# Patient Record
Sex: FEMALE | URBAN - METROPOLITAN AREA
[De-identification: names, ages, dates, MRNs, and addresses within clinical notes are randomized per-mention and may not be internally consistent; named-entity substitution may affect disease eponyms.]

---

## 2023-08-03 ENCOUNTER — OFFICE VISIT (OUTPATIENT)
Dept: URBAN - METROPOLITAN AREA CLINIC 92 | Facility: CLINIC | Age: 23
End: 2023-08-03
Payer: COMMERCIAL

## 2023-08-03 VITALS
TEMPERATURE: 97.7 F | DIASTOLIC BLOOD PRESSURE: 62 MMHG | SYSTOLIC BLOOD PRESSURE: 100 MMHG | HEART RATE: 65 BPM | WEIGHT: 109 LBS | BODY MASS INDEX: 20.06 KG/M2 | HEIGHT: 62 IN

## 2023-08-03 DIAGNOSIS — K21.9 GASTROESOPHAGEAL REFLUX DISEASE WITHOUT ESOPHAGITIS: ICD-10-CM

## 2023-08-03 DIAGNOSIS — K62.5 RECTAL BLEEDING: ICD-10-CM

## 2023-08-03 DIAGNOSIS — K64.4 EXTERNAL HEMORRHOID: ICD-10-CM

## 2023-08-03 DIAGNOSIS — R05.3 CHRONIC COUGH: ICD-10-CM

## 2023-08-03 DIAGNOSIS — K58.1 IRRITABLE BOWEL SYNDROME WITH CONSTIPATION: ICD-10-CM

## 2023-08-03 PROBLEM — 440630006: Status: ACTIVE | Noted: 2023-08-03

## 2023-08-03 PROBLEM — 266435005: Status: ACTIVE | Noted: 2023-08-03

## 2023-08-03 PROCEDURE — 99204 OFFICE O/P NEW MOD 45 MIN: CPT

## 2023-08-03 RX ORDER — LINACLOTIDE 290 UG/1
1 CAPSULE AT LEAST 30 MINUTES BEFORE THE FIRST MEAL OF THE DAY ON AN EMPTY STOMACH CAPSULE, GELATIN COATED ORAL ONCE A DAY
Qty: 90 | Refills: 1 | OUTPATIENT
Start: 2023-08-03 | End: 2024-01-29

## 2023-08-03 RX ORDER — OMEPRAZOLE 40 MG/1
1 CAPSULE 30 MINUTES BEFORE MORNING MEAL CAPSULE, DELAYED RELEASE ORAL ONCE A DAY
Qty: 90 | Refills: 1 | OUTPATIENT
Start: 2023-08-03

## 2023-08-03 RX ORDER — HYDROCORTISONE 25 MG/G
1 APPLICATION CREAM TOPICAL TWICE A DAY
Qty: 1 | Refills: 1 | OUTPATIENT
Start: 2023-08-03 | End: 2023-08-23

## 2023-08-03 NOTE — HPI-TODAY'S VISIT:
22-year-old female patient presents today with complaints of constipation and blood in stool. Patient states that she has always had issues with constipation in the past and usually goes every week.  Recently she has not been going for 2 weeks at a time.  Every time she has a bowel movement she notices rectal bleeding mainly on the tissue paper.  She also notes rectal pain while having a bowel movement.  She denies any rectal itching or burning.  She has always had blood when she wipes every time she has a bowel movement for several years.  She has no associated abdominal pain, melena or weight loss.  She has not tried anything for her constipation or rectal bleeding. She also notes issues with chronic cough that has been occurring for several months.  She states that cough will occur after eating certain types of foods.  She does have some burning as well with this.  She has not tried thing for this in the past.  She denies any nausea, vomiting, fevers, chills, dysphagia, dyne aphasia, NSAID use.  No family history of any GI related cancers. She sees PCP regular had normal blood work few months ago.

## 2023-08-03 NOTE — PHYSICAL EXAM GASTROINTESTINAL
Abdomen,  soft, nontender, nondistended,  no guarding or rigidity,  no masses palpable,  normal bowel sounds Liver and Spleen,no hepatosplenomegaly Rectal one large EH noted non bleeding non thrombosed. Skin tag noted on posterior no fissure

## 2023-08-07 ENCOUNTER — OFFICE VISIT (OUTPATIENT)
Dept: URBAN - METROPOLITAN AREA CLINIC 92 | Facility: CLINIC | Age: 23
End: 2023-08-07

## 2023-09-13 ENCOUNTER — OFFICE VISIT (OUTPATIENT)
Dept: URBAN - METROPOLITAN AREA CLINIC 92 | Facility: CLINIC | Age: 23
End: 2023-09-13
Payer: COMMERCIAL

## 2023-09-13 VITALS
HEIGHT: 62 IN | TEMPERATURE: 97.2 F | BODY MASS INDEX: 20.24 KG/M2 | DIASTOLIC BLOOD PRESSURE: 69 MMHG | SYSTOLIC BLOOD PRESSURE: 104 MMHG | HEART RATE: 77 BPM | WEIGHT: 110 LBS

## 2023-09-13 DIAGNOSIS — R05.3 CHRONIC COUGH: ICD-10-CM

## 2023-09-13 DIAGNOSIS — K21.9 GASTROESOPHAGEAL REFLUX DISEASE WITHOUT ESOPHAGITIS: ICD-10-CM

## 2023-09-13 DIAGNOSIS — K58.1 IRRITABLE BOWEL SYNDROME WITH CONSTIPATION: ICD-10-CM

## 2023-09-13 DIAGNOSIS — K64.4 EXTERNAL HEMORRHOID: ICD-10-CM

## 2023-09-13 DIAGNOSIS — K62.5 RECTAL BLEEDING: ICD-10-CM

## 2023-09-13 PROCEDURE — 99213 OFFICE O/P EST LOW 20 MIN: CPT

## 2023-09-13 RX ORDER — LINACLOTIDE 290 UG/1
1 CAPSULE AT LEAST 30 MINUTES BEFORE THE FIRST MEAL OF THE DAY ON AN EMPTY STOMACH CAPSULE, GELATIN COATED ORAL ONCE A DAY
Qty: 90 | Refills: 1 | Status: ACTIVE | COMMUNITY
Start: 2023-08-03 | End: 2024-01-29

## 2023-09-13 RX ORDER — OMEPRAZOLE 40 MG/1
1 CAPSULE 30 MINUTES BEFORE MORNING MEAL CAPSULE, DELAYED RELEASE ORAL ONCE A DAY
Qty: 90 | Refills: 1 | Status: ACTIVE | COMMUNITY
Start: 2023-08-03

## 2023-09-13 NOTE — HPI-TODAY'S VISIT:
22-year-old female patient presents today with complaints of constipation and blood in stool  Patient states that she has always had issues with constipation in the past and usually goes every week.  Recently she has not been going for 2 weeks at a time. After last visit she was placed on Linzess 290mcg. This allows her to have 2 BM daily but has urgency and bloating. She also noted rectal bleeding. EH was found on exam. Anusol was rx but she never picked this up. Rectal bleeding resolved on its own after constipation resolved.   She also noted issues with chronic cough with mucus production that has been occurring for several months.  She states that cough will occur after eating certain types of foods.  She does have some burning as well with this. She also notes post nasal drip. She tried Omeprazole 40mg with no relief.   She denies any nausea, vomiting, fevers, chills, dysphagia, odynophagia, NSAID use.  No family history of any GI related cancers.  She sees PCP regular had normal blood work few months ago.

## 2023-12-07 ENCOUNTER — OFFICE VISIT (OUTPATIENT)
Dept: URBAN - METROPOLITAN AREA CLINIC 92 | Facility: CLINIC | Age: 23
End: 2023-12-07
Payer: COMMERCIAL

## 2023-12-07 VITALS
DIASTOLIC BLOOD PRESSURE: 62 MMHG | HEIGHT: 62 IN | HEART RATE: 68 BPM | WEIGHT: 111 LBS | TEMPERATURE: 97 F | BODY MASS INDEX: 20.43 KG/M2 | SYSTOLIC BLOOD PRESSURE: 100 MMHG

## 2023-12-07 DIAGNOSIS — K21.9 GASTROESOPHAGEAL REFLUX DISEASE WITHOUT ESOPHAGITIS: ICD-10-CM

## 2023-12-07 DIAGNOSIS — K62.5 RECTAL BLEEDING: ICD-10-CM

## 2023-12-07 DIAGNOSIS — K64.4 EXTERNAL HEMORRHOID: ICD-10-CM

## 2023-12-07 DIAGNOSIS — K58.1 IRRITABLE BOWEL SYNDROME WITH CONSTIPATION: ICD-10-CM

## 2023-12-07 DIAGNOSIS — R05.3 CHRONIC COUGH: ICD-10-CM

## 2023-12-07 PROCEDURE — 99213 OFFICE O/P EST LOW 20 MIN: CPT

## 2023-12-07 RX ORDER — OMEPRAZOLE 40 MG/1
1 CAPSULE 30 MINUTES BEFORE MORNING MEAL CAPSULE, DELAYED RELEASE ORAL TWICE A DAY
Qty: 180 | Refills: 1 | OUTPATIENT
Start: 2023-12-07

## 2023-12-07 RX ORDER — OMEPRAZOLE 40 MG/1
1 CAPSULE 30 MINUTES BEFORE MORNING MEAL CAPSULE, DELAYED RELEASE ORAL ONCE A DAY
Qty: 90 | Refills: 1 | Status: DISCONTINUED | COMMUNITY
Start: 2023-08-03

## 2023-12-07 RX ORDER — LINACLOTIDE 290 UG/1
1 CAPSULE AT LEAST 30 MINUTES BEFORE THE FIRST MEAL OF THE DAY ON AN EMPTY STOMACH CAPSULE, GELATIN COATED ORAL ONCE A DAY
Qty: 90 | Refills: 1 | Status: ACTIVE | COMMUNITY
Start: 2023-08-03 | End: 2024-01-29

## 2023-12-07 NOTE — HPI-TODAY'S VISIT:
22-year-old female patient presents today with complaints of constipation and blood in stool  Patient states that she has always had issues with constipation in the past and usually goes every week.  Recently she has not been going for 2 weeks at a time. After last visit she was placed on Linzess 290mcg. This allows her to have 2 BM daily but has urgency and bloating. She was told to try Linzess 145mcg and Trulance. Linzess 145mcg works and does not cause her urgency or bloating like 290 did. However, her stool is softer. Trulance caused her to have too many BM daily. She also noted rectal bleeding. EH was found on exam. Anusol was rx but she never picked this up. Rectal bleeding resolved on its own after constipation resolved.   She also noted issues with chronic cough with mucus production that has been occurring for several months.  She states that cough will occur after eating certain types of foods.  She does have some burning as well with this. She also notes post nasal drip. She tried Omeprazole 40mg with no relief after last visit she d/c. Once she d/c this she noticed that omeprazole was actually helping some. Has not seen ENT yet. She denies any nausea, vomiting, fevers, chills, dysphagia, odynophagia, NSAID use.  No family history of any GI related cancers.  She sees PCP regular had normal blood work few months ago.

## 2024-01-29 ENCOUNTER — TELEPHONE ENCOUNTER (OUTPATIENT)
Dept: URBAN - METROPOLITAN AREA CLINIC 92 | Facility: CLINIC | Age: 24
End: 2024-01-29

## 2024-01-29 RX ORDER — LINACLOTIDE 290 UG/1
1 CAPSULE AT LEAST 30 MINUTES BEFORE THE FIRST MEAL OF THE DAY ON AN EMPTY STOMACH CAPSULE, GELATIN COATED ORAL ONCE A DAY
Qty: 90 | Refills: 1
Start: 2023-08-03 | End: 2024-07-27

## 2024-01-30 ENCOUNTER — TELEPHONE ENCOUNTER (OUTPATIENT)
Dept: URBAN - METROPOLITAN AREA CLINIC 92 | Facility: CLINIC | Age: 24
End: 2024-01-30

## 2024-03-07 ENCOUNTER — OV EP (OUTPATIENT)
Dept: URBAN - METROPOLITAN AREA CLINIC 92 | Facility: CLINIC | Age: 24
End: 2024-03-07

## 2024-03-07 RX ORDER — OMEPRAZOLE 40 MG/1
1 CAPSULE 30 MINUTES BEFORE MORNING MEAL CAPSULE, DELAYED RELEASE ORAL TWICE A DAY
Qty: 180 | Refills: 1 | Status: ACTIVE | COMMUNITY
Start: 2023-12-07

## 2024-03-07 RX ORDER — OMEPRAZOLE 40 MG/1
1 CAPSULE 30 MINUTES BEFORE MORNING MEAL CAPSULE, DELAYED RELEASE ORAL TWICE A DAY
Qty: 180 | Refills: 1 | OUTPATIENT

## 2024-03-07 RX ORDER — LINACLOTIDE 290 UG/1
1 CAPSULE AT LEAST 30 MINUTES BEFORE THE FIRST MEAL OF THE DAY ON AN EMPTY STOMACH CAPSULE, GELATIN COATED ORAL ONCE A DAY
Qty: 90 | Refills: 1 | Status: ACTIVE | COMMUNITY
Start: 2023-08-03 | End: 2024-07-27

## 2024-05-01 ENCOUNTER — OFFICE VISIT (OUTPATIENT)
Dept: URBAN - METROPOLITAN AREA CLINIC 92 | Facility: CLINIC | Age: 24
End: 2024-05-01

## 2024-05-01 RX ORDER — OMEPRAZOLE 40 MG/1
1 CAPSULE 30 MINUTES BEFORE MORNING MEAL CAPSULE, DELAYED RELEASE ORAL TWICE A DAY
Qty: 180 | Refills: 1 | Status: ACTIVE | COMMUNITY

## 2024-05-01 RX ORDER — LINACLOTIDE 290 UG/1
1 CAPSULE AT LEAST 30 MINUTES BEFORE THE FIRST MEAL OF THE DAY ON AN EMPTY STOMACH CAPSULE, GELATIN COATED ORAL ONCE A DAY
Qty: 90 | Refills: 1 | Status: ACTIVE | COMMUNITY
Start: 2023-08-03 | End: 2024-07-27

## 2024-05-07 ENCOUNTER — DASHBOARD ENCOUNTERS (OUTPATIENT)
Age: 24
End: 2024-05-07

## 2024-05-07 ENCOUNTER — OFFICE VISIT (OUTPATIENT)
Dept: URBAN - METROPOLITAN AREA CLINIC 105 | Facility: CLINIC | Age: 24
End: 2024-05-07
Payer: COMMERCIAL

## 2024-05-07 VITALS
SYSTOLIC BLOOD PRESSURE: 110 MMHG | DIASTOLIC BLOOD PRESSURE: 75 MMHG | HEART RATE: 78 BPM | WEIGHT: 115.2 LBS | BODY MASS INDEX: 21.2 KG/M2 | TEMPERATURE: 98.6 F | HEIGHT: 62 IN

## 2024-05-07 DIAGNOSIS — R05.3 CHRONIC COUGH: ICD-10-CM

## 2024-05-07 DIAGNOSIS — K58.1 IRRITABLE BOWEL SYNDROME WITH CONSTIPATION: ICD-10-CM

## 2024-05-07 DIAGNOSIS — R14.0 BLOATING: ICD-10-CM

## 2024-05-07 PROCEDURE — 99214 OFFICE O/P EST MOD 30 MIN: CPT | Performed by: INTERNAL MEDICINE

## 2024-05-07 RX ORDER — LINACLOTIDE 290 UG/1
1 CAPSULE AT LEAST 30 MINUTES BEFORE THE FIRST MEAL OF THE DAY ON AN EMPTY STOMACH CAPSULE, GELATIN COATED ORAL ONCE A DAY
Qty: 90 | Refills: 1 | Status: ACTIVE | COMMUNITY
Start: 2023-08-03 | End: 2024-07-27

## 2024-05-07 RX ORDER — OMEPRAZOLE 40 MG/1
1 CAPSULE 30 MINUTES BEFORE MORNING MEAL CAPSULE, DELAYED RELEASE ORAL TWICE A DAY
Qty: 180 | Refills: 1 | Status: ACTIVE | COMMUNITY

## 2024-05-07 RX ORDER — CIPROFLOXACIN HYDROCHLORIDE 500 MG/1
1 TABLET TABLET, FILM COATED ORAL
Qty: 20 TABLET | Refills: 0 | OUTPATIENT
Start: 2024-05-07 | End: 2024-05-17

## 2024-06-16 ENCOUNTER — WEB ENCOUNTER (OUTPATIENT)
Dept: URBAN - METROPOLITAN AREA CLINIC 92 | Facility: CLINIC | Age: 24
End: 2024-06-16

## 2024-06-17 ENCOUNTER — WEB ENCOUNTER (OUTPATIENT)
Dept: URBAN - METROPOLITAN AREA CLINIC 92 | Facility: CLINIC | Age: 24
End: 2024-06-17

## 2024-07-26 ENCOUNTER — OFFICE VISIT (OUTPATIENT)
Dept: URBAN - METROPOLITAN AREA CLINIC 105 | Facility: CLINIC | Age: 24
End: 2024-07-26

## 2024-07-26 ENCOUNTER — LAB OUTSIDE AN ENCOUNTER (OUTPATIENT)
Dept: URBAN - METROPOLITAN AREA CLINIC 105 | Facility: CLINIC | Age: 24
End: 2024-07-26

## 2024-07-26 VITALS
HEART RATE: 54 BPM | BODY MASS INDEX: 21.53 KG/M2 | TEMPERATURE: 97 F | HEIGHT: 62 IN | SYSTOLIC BLOOD PRESSURE: 109 MMHG | WEIGHT: 117 LBS | DIASTOLIC BLOOD PRESSURE: 73 MMHG

## 2024-07-26 DIAGNOSIS — R05.3 CHRONIC COUGH: ICD-10-CM

## 2024-07-26 DIAGNOSIS — K60.2 ANAL FISSURE: ICD-10-CM

## 2024-07-26 DIAGNOSIS — K62.5 RECTAL BLEEDING: ICD-10-CM

## 2024-07-26 DIAGNOSIS — R14.0 BLOATING: ICD-10-CM

## 2024-07-26 DIAGNOSIS — K58.1 IRRITABLE BOWEL SYNDROME WITH CONSTIPATION: ICD-10-CM

## 2024-07-26 PROCEDURE — 99214 OFFICE O/P EST MOD 30 MIN: CPT | Performed by: INTERNAL MEDICINE

## 2024-07-26 RX ORDER — LINACLOTIDE 290 UG/1
1 CAPSULE AT LEAST 30 MINUTES BEFORE THE FIRST MEAL OF THE DAY ON AN EMPTY STOMACH CAPSULE, GELATIN COATED ORAL ONCE A DAY
Qty: 90 | Refills: 1 | Status: ON HOLD | COMMUNITY
Start: 2023-08-03 | End: 2024-07-27

## 2024-07-26 RX ORDER — LINACLOTIDE 72 UG/1
1 CAPSULE AT LEAST 30 MINUTES BEFORE THE FIRST MEAL OF THE DAY ON AN EMPTY STOMACH CAPSULE, GELATIN COATED ORAL ONCE A DAY
Qty: 90 | Refills: 0 | OUTPATIENT
Start: 2024-07-26 | End: 2024-10-24

## 2024-07-26 RX ORDER — OMEPRAZOLE 40 MG/1
1 CAPSULE 30 MINUTES BEFORE MORNING MEAL CAPSULE, DELAYED RELEASE ORAL TWICE A DAY
Qty: 180 | Refills: 1 | Status: ACTIVE | COMMUNITY

## 2024-07-26 NOTE — EXAM-PHYSICAL EXAM
no Gen: Alert, oriented, in no distress Heent: no icterus, lips wnl Lungs: bilateral breath sounds CVS: first and second heart sounds Abdomen: full, soft, non tender Ext: no edema Joints: normal joints and symmetry Spine: consistent with age Skin: no rash on exposed areas Neuro: no focal localizing signs   Perianal _ skin tags, some ttp of RP on JEFFERSON. no anoscopy done for this reason

## 2024-07-26 NOTE — HPI-TODAY'S VISIT:
5/2024 Evaristo The patient presents on follow-up for constipation, coughing. She was previously seen by [facility] Banegas.   Today, the patient says she is switching care to me due to insurance change. Linzess works, but ran out - last took 2 weeks ago. She was taking Linzess daily. On Linzess, she had 2-3 BMs/day. Her stools were mostly watery she stated. She never took Miralax. She takes omeprazole for post-prandial cough. She is on the antacid QOD. She took omeprazole BID for a month without a benefit. Bloating is an issue for a year, there all the time. Without the Linzess, she would have a BM 2x/week - no improvement in bloating with more BMs.  7/26/24 Here for visit - wants to switch MDs c/o hemorrhoidal bleeding and anal pain for months but worse over 2 weeks Baseline constipation 2ice a week Linzess 290 and 145 gave diarrhea. Likes linzess 72 - 2 BMs a day Ran out so taking an otc laxative which upsets her stomach. Never had a colonoscopy. No fhx of colon CA.